# Patient Record
Sex: MALE | Race: WHITE | URBAN - METROPOLITAN AREA
[De-identification: names, ages, dates, MRNs, and addresses within clinical notes are randomized per-mention and may not be internally consistent; named-entity substitution may affect disease eponyms.]

---

## 2017-09-26 ENCOUNTER — OFFICE VISIT (OUTPATIENT)
Dept: FAMILY MEDICINE | Facility: CLINIC | Age: 22
End: 2017-09-26
Payer: COMMERCIAL

## 2017-09-26 VITALS
OXYGEN SATURATION: 98 % | SYSTOLIC BLOOD PRESSURE: 116 MMHG | DIASTOLIC BLOOD PRESSURE: 64 MMHG | HEIGHT: 70 IN | HEART RATE: 83 BPM | BODY MASS INDEX: 25.34 KG/M2 | WEIGHT: 177 LBS

## 2017-09-26 DIAGNOSIS — Z02.89 HEALTH EXAMINATION OF DEFINED SUBPOPULATION: Primary | ICD-10-CM

## 2017-09-26 LAB
ALBUMIN UR-MCNC: NEGATIVE MG/DL
APPEARANCE UR: CLEAR
BILIRUB UR QL STRIP: NEGATIVE
COLOR UR AUTO: YELLOW
GLUCOSE UR STRIP-MCNC: NEGATIVE MG/DL
HGB UR QL STRIP: NEGATIVE
KETONES UR STRIP-MCNC: NEGATIVE MG/DL
LEUKOCYTE ESTERASE UR QL STRIP: NEGATIVE
NITRATE UR QL: NEGATIVE
PH UR STRIP: 7 PH (ref 5–7)
SOURCE: NORMAL
SP GR UR STRIP: 1.01 (ref 1–1.03)
UROBILINOGEN UR STRIP-ACNC: 0.2 EU/DL (ref 0.2–1)

## 2017-09-26 PROCEDURE — 81003 URINALYSIS AUTO W/O SCOPE: CPT | Performed by: FAMILY MEDICINE

## 2017-09-26 PROCEDURE — 99499 UNLISTED E&M SERVICE: CPT | Performed by: FAMILY MEDICINE

## 2017-09-26 NOTE — NURSING NOTE
"Chief Complaint   Patient presents with     FAA Physical       Initial /64  Pulse 83  Ht 5' 9.75\" (1.772 m)  Wt 177 lb (80.3 kg)  SpO2 98%  BMI 25.58 kg/m2 Estimated body mass index is 25.58 kg/(m^2) as calculated from the following:    Height as of this encounter: 5' 9.75\" (1.772 m).    Weight as of this encounter: 177 lb (80.3 kg).  Medication Reconciliation: complete  "

## 2017-09-26 NOTE — PROGRESS NOTES
"FAA Physical- see scanned form    Lyle [place]woodblochkin  [address] [place] [address]    No results found for this or any previous visit (from the past 24 hour(s)).    -  1995  Height- 5' 9.75\"  Weight- 177 lbs 0 oz  /64  Pulse 83  Ht 5' 9.75\" (1.772 m)  Wt 177 lb (80.3 kg)  SpO2 98%  BMI 25.58 kg/m2    FAA Login Page    "

## 2017-09-26 NOTE — MR AVS SNAPSHOT
"              After Visit Summary   2017    Stephan Yablochkin    MRN: [id number]           Patient Information     Date Of Birth          1995        Visit Information        Provider Department      2017 3:40 PM Jef Art MD [facility]     Health examination of defined subpopulation    -  1       Follow-ups after your visit        Who to contact     If you have questions or need follow up information about today's clinic visit or your schedule please contact [facility] directly at [phone].  Normal or non-critical lab and imaging results will be communicated to you by [employer], letter or phone within 4 business days after the clinic has received the results. If you do not hear from us within 7 days, please contact the clinic through [employer] or phone. If you have a critical or abnormal lab result, we will notify you by phone as soon as possible.  Submit refill requests through [employer] or call your pharmacy and they will forward the refill request to us. Please allow 3 business days for your refill to be completed.          Additional Information About Your Visit        MyChart Information     [employer] lets you send messages to your doctor, view your test results, renew your prescriptions, schedule appointments and more. To sign up, go to [email or web address] . Click on \"Log in\" on the left side of the screen, which will take you to the Welcome page. Then click on \"Sign up Now\" on the right side of the page.     You will be asked to enter the access code listed below, as well as some personal information. Please follow the directions to create your username and password.     Your access code is: [email or web address]  Expires: 2017  4:17 PM     Your access code will  in 90 days. If you need help or a new code, please call your [facility] or [phone].        Care EveryWhere ID     This is your Care EveryWhere ID. This " "could be used by other organizations to access your [place] medical records  [id number]        Your Vitals Were     Pulse Height Pulse Oximetry BMI (Body Mass Index)          83 5' 9.75\" (1.772 m) 98% 25.58 kg/m2         Blood Pressure from Last 3 Encounters:   09/26/17 116/64   05/17/16 122/62    Weight from Last 3 Encounters:   09/26/17 177 lb (80.3 kg)   05/17/16 190 lb (86.2 kg)              We Performed the Following     UA without Microscopic        Primary Care Provider Office Phone # Fax #    Jef Art [phone]       [address] [facility] [address]        Equal Access to Services     BERE BUSTOS : Hadii edward talaverao Somarie, waaxda luqadaha, qaybta kaalmada adeegyada, argenis cabello . So [facility] [phone].    ATENCIÓN: Si habla español, tiene a batista disposición servicios gratuitos de asistencia lingüística. Llame al [phone].    We comply with applicable federal civil rights laws and [place] laws. We do not discriminate on the basis of race, color, national origin, age, disability sex, sexual orientation or gender identity.            Thank you!     Thank you for choosing [facility]  for your care. Our goal is always to provide you with excellent care. Hearing back from our patients is one way we can continue to improve our services. Please take a few minutes to complete the written survey that you may receive in the mail after your visit with us. Thank you!             Your Updated Medication List - Protect others around you: Learn how to safely use, store and throw away your medicines at www.disposemymeds.org.      Notice  As of 9/26/2017  4:17 PM    You have not been prescribed any medications.      "

## 2017-09-29 ENCOUNTER — OFFICE VISIT (OUTPATIENT)
Dept: FAMILY MEDICINE | Facility: CLINIC | Age: 22
End: 2017-09-29
Payer: COMMERCIAL

## 2017-09-29 ENCOUNTER — TELEPHONE (OUTPATIENT)
Dept: FAMILY MEDICINE | Facility: CLINIC | Age: 22
End: 2017-09-29

## 2017-09-29 VITALS
DIASTOLIC BLOOD PRESSURE: 68 MMHG | HEIGHT: 70 IN | TEMPERATURE: 98.3 F | SYSTOLIC BLOOD PRESSURE: 120 MMHG | OXYGEN SATURATION: 99 % | BODY MASS INDEX: 25.34 KG/M2 | WEIGHT: 177 LBS | HEART RATE: 82 BPM

## 2017-09-29 DIAGNOSIS — R07.9 CHEST PAIN, UNSPECIFIED TYPE: ICD-10-CM

## 2017-09-29 DIAGNOSIS — R51.9 NONINTRACTABLE EPISODIC HEADACHE, UNSPECIFIED HEADACHE TYPE: ICD-10-CM

## 2017-09-29 DIAGNOSIS — R53.83 FATIGUE, UNSPECIFIED TYPE: Primary | ICD-10-CM

## 2017-09-29 DIAGNOSIS — R06.83 SNORING: ICD-10-CM

## 2017-09-29 LAB
BASOPHILS # BLD AUTO: 0 10E9/L (ref 0–0.2)
BASOPHILS NFR BLD AUTO: 0.3 %
DIFFERENTIAL METHOD BLD: NORMAL
EOSINOPHIL # BLD AUTO: 0 10E9/L (ref 0–0.7)
EOSINOPHIL NFR BLD AUTO: 0.3 %
ERYTHROCYTE [DISTWIDTH] IN BLOOD BY AUTOMATED COUNT: 11.7 % (ref 10–15)
HCT VFR BLD AUTO: 43.3 % (ref 40–53)
HGB BLD-MCNC: 15.1 G/DL (ref 13.3–17.7)
LYMPHOCYTES # BLD AUTO: 1.7 10E9/L (ref 0.8–5.3)
LYMPHOCYTES NFR BLD AUTO: 26.4 %
MCH RBC QN AUTO: 31.5 PG (ref 26.5–33)
MCHC RBC AUTO-ENTMCNC: 34.9 G/DL (ref 31.5–36.5)
MCV RBC AUTO: 90 FL (ref 78–100)
MONOCYTES # BLD AUTO: 0.6 10E9/L (ref 0–1.3)
MONOCYTES NFR BLD AUTO: 9.3 %
NEUTROPHILS # BLD AUTO: 4 10E9/L (ref 1.6–8.3)
NEUTROPHILS NFR BLD AUTO: 63.7 %
PLATELET # BLD AUTO: 231 10E9/L (ref 150–450)
RBC # BLD AUTO: 4.8 10E12/L (ref 4.4–5.9)
TSH SERPL DL<=0.005 MIU/L-ACNC: 1.9 MU/L (ref 0.4–4)
WBC # BLD AUTO: 6.3 10E9/L (ref 4–11)

## 2017-09-29 PROCEDURE — 36415 COLL VENOUS BLD VENIPUNCTURE: CPT | Performed by: FAMILY MEDICINE

## 2017-09-29 PROCEDURE — 85025 COMPLETE CBC W/AUTO DIFF WBC: CPT | Performed by: FAMILY MEDICINE

## 2017-09-29 PROCEDURE — 84443 ASSAY THYROID STIM HORMONE: CPT | Performed by: FAMILY MEDICINE

## 2017-09-29 PROCEDURE — 93000 ELECTROCARDIOGRAM COMPLETE: CPT | Performed by: FAMILY MEDICINE

## 2017-09-29 PROCEDURE — 99214 OFFICE O/P EST MOD 30 MIN: CPT | Performed by: FAMILY MEDICINE

## 2017-09-29 NOTE — MR AVS SNAPSHOT
"              After Visit Summary   9/29/2017    Stephan Yablochkin    MRN: [id number]           Patient Information     Date Of Birth          1995        Visit Information        Provider Department      9/29/2017 1:40 PM Jef Art MD [facility]        Today's Diagnoses     Fatigue, unspecified type    -  1    Snoring        Nonintractable episodic headache, unspecified headache type        Chest pain, unspecified type           Follow-ups after your visit        Who to contact     If you have questions or need follow up information about today's clinic visit or your schedule please contact [facility] directly at [phone].  Normal or non-critical lab and imaging results will be communicated to you by [employer], letter or phone within 4 business days after the clinic has received the results. If you do not hear from us within 7 days, please contact the clinic through [employer] or phone. If you have a critical or abnormal lab result, we will notify you by phone as soon as possible.  Submit refill requests through [employer] or call your pharmacy and they will forward the refill request to us. Please allow 3 business days for your refill to be completed.          Additional Information About Your Visit        MyChart Information     [employer] gives you secure access to your electronic health record. If you see a primary care provider, you can also send messages to your care team and make appointments. If you have questions, please call your primary care clinic.  If you do not have a primary care provider, please call [phone] and they will assist you.        Care EveryWhere ID     This is your Care EveryWhere ID. This could be used by other organizations to access your [place] medical records  [id number]        Your Vitals Were     Pulse Temperature Height Pulse Oximetry BMI (Body Mass Index)       82 98.3  F (36.8  C) (Oral) 5' 9.75\" (1.772 m) 99% 25.58 kg/m2        " Blood Pressure from Last 3 Encounters:   09/29/17 120/68   09/26/17 116/64   05/17/16 122/62    Weight from Last 3 Encounters:   09/29/17 177 lb (80.3 kg)   09/26/17 177 lb (80.3 kg)   05/17/16 190 lb (86.2 kg)              We Performed the Following     CBC with platelets and differential     EKG 12-lead complete w/read - Clinics     TSH with free T4 reflex        Primary Care Provider Office Phone # Fax #    Jef Art [phone] [address] [facility] [address]        Equal Access to Services     [facility] : Hadii edward Francisco, washelly stack, jonelle cottrellalmasapphire geronimo, argenis cabello . So [facility] [phone].    ATENCIÓN: Si habla español, tiene a batista disposición servicios gratuitos de asistencia lingüística. Llame al [phone].    We comply with applicable federal civil rights laws and [place] laws. We do not discriminate on the basis of race, color, national origin, age, disability, sex, sexual orientation, or gender identity.            Thank you!     Thank you for choosing [facility]  for your care. Our goal is always to provide you with excellent care. Hearing back from our patients is one way we can continue to improve our services. Please take a few minutes to complete the written survey that you may receive in the mail after your visit with us. Thank you!             Your Updated Medication List - Protect others around you: Learn how to safely use, store and throw away your medicines at www.disposemymeds.org.      Notice  As of 9/29/2017  2:33 PM    You have not been prescribed any medications.

## 2017-09-29 NOTE — TELEPHONE ENCOUNTER
Fatigue/loss of appetite/headaches      Duration: fatigue x 2 months and loss of appetite started 9/26/2017    Intensity:  mild, increasing    Accompanying signs and symptoms: nausea only eats once per day    History (similar episodes/previous evaluation): None    Precipitating or alleviating factors: None    Therapies tried and outcome: None        Patient denies fever, abdominal pain and vomiting.  He is unsure if he has lost weight. 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  He will be leaving tomorrow to go back to [place].    RN advised office visit for evaluation.  Scheduled with RL today at 1:40 pm.      RAMANDEEP Solomon, RN, N  [facility]) [phone]

## 2017-09-29 NOTE — NURSING NOTE
"Chief Complaint   Patient presents with     Fatigue       Initial /68 (BP Location: Left arm, Patient Position: Chair, Cuff Size: Adult Large)  Pulse 82  Temp 98.3  F (36.8  C) (Oral)  Ht 5' 9.75\" (1.772 m)  Wt 177 lb (80.3 kg)  SpO2 99%  BMI 25.58 kg/m2 Estimated body mass index is 25.58 kg/(m^2) as calculated from the following:    Height as of this encounter: 5' 9.75\" (1.772 m).    Weight as of this encounter: 177 lb (80.3 kg).  Medication Reconciliation: complete  "

## 2017-09-29 NOTE — TELEPHONE ENCOUNTER
Reason for Call:  Other call back    Detailed comments: patient was in to see Dr. Art on 9/26/17 and has some questions regarding this visit.  Please call back to discuss.    Phone Number Patient can be reached at: Cell number on file:  [phone]  No relevant phone numbers on file.       Best Time: any    Can we leave a detailed message on this number? YES    Call taken on 9/29/2017 at 11:51 AM by Heather Smith

## 2017-10-01 NOTE — PROGRESS NOTES
Dear Mati,    Here is a summary of your recent test results:  -TSH (thyroid stimulating hormone) level is normal which indicates normal thyroid function.  -Normal red blood cell (hgb) levels, normal white blood cell count and normal platelet levels.    For additional lab test information, labtestsonline.org is an excellent reference.    In addition, here is a list of due or overdue Health Maintenance reminders:           Thank you very much for trusting me and [facility].     Healthy regards,  Jonah Art MD

## 2017-12-31 ENCOUNTER — HEALTH MAINTENANCE LETTER (OUTPATIENT)
Age: 22
End: 2017-12-31

## 2018-09-18 ENCOUNTER — TELEPHONE (OUTPATIENT)
Dept: FAMILY MEDICINE | Facility: CLINIC | Age: 23
End: 2018-09-18

## 2018-09-18 NOTE — TELEPHONE ENCOUNTER
Offer the 9:00 AM on Thursday 09/20/2018 other wise he has to wait until next week. Jayna Forman CMA

## 2018-09-18 NOTE — TELEPHONE ENCOUNTER
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 Open to any time & day this week    Duration of symptoms: NA    Have you been treated for this in the past? Yes    Additional comments: NA    Can we leave a detailed message on this number? YES    Phone number patient can be reached at: Cell number on file:    Telephone Information:   Mobile [phone]       Best Time: any    Call taken on 9/18/2018 at 11:59 AM by Cheryl Blancas

## 2018-09-19 NOTE — TELEPHONE ENCOUNTER
The patient says he can't do 9:00 a.m. On 09/20/2018 or next week as he is leaving for [place] and won't be back for a while. He says he will check surrounding clinics to get in somewhere today.      Sophia Camacho  Patient Representative

## 2020-03-10 ENCOUNTER — HEALTH MAINTENANCE LETTER (OUTPATIENT)
Age: 25
End: 2020-03-10

## 2020-12-27 ENCOUNTER — HEALTH MAINTENANCE LETTER (OUTPATIENT)
Age: 25
End: 2020-12-27

## 2021-04-24 ENCOUNTER — HEALTH MAINTENANCE LETTER (OUTPATIENT)
Age: 26
End: 2021-04-24

## 2021-10-09 ENCOUNTER — HEALTH MAINTENANCE LETTER (OUTPATIENT)
Age: 26
End: 2021-10-09

## 2022-05-16 ENCOUNTER — HEALTH MAINTENANCE LETTER (OUTPATIENT)
Age: 27
End: 2022-05-16

## 2022-09-11 ENCOUNTER — HEALTH MAINTENANCE LETTER (OUTPATIENT)
Age: 27
End: 2022-09-11

## 2023-06-03 ENCOUNTER — HEALTH MAINTENANCE LETTER (OUTPATIENT)
Age: 28
End: 2023-06-03